# Patient Record
Sex: MALE | Race: OTHER | HISPANIC OR LATINO | URBAN - METROPOLITAN AREA
[De-identification: names, ages, dates, MRNs, and addresses within clinical notes are randomized per-mention and may not be internally consistent; named-entity substitution may affect disease eponyms.]

---

## 2017-08-30 ENCOUNTER — EMERGENCY (EMERGENCY)
Facility: HOSPITAL | Age: 32
LOS: 0 days | Discharge: HOME | End: 2017-08-30
Attending: EMERGENCY MEDICINE | Admitting: EMERGENCY MEDICINE
Payer: SELF-PAY

## 2017-08-30 VITALS — WEIGHT: 145.06 LBS | HEIGHT: 67 IN

## 2017-08-30 VITALS
HEART RATE: 66 BPM | DIASTOLIC BLOOD PRESSURE: 91 MMHG | RESPIRATION RATE: 16 BRPM | SYSTOLIC BLOOD PRESSURE: 148 MMHG | OXYGEN SATURATION: 100 % | TEMPERATURE: 98 F

## 2017-08-30 PROCEDURE — 99284 EMERGENCY DEPT VISIT MOD MDM: CPT

## 2017-08-30 NOTE — ED STATDOCS - EYES, MLM
Pupils equal, round, and reactive to light. No foreign body. No fluorescein uptake. No hyphema. No hypopyon. Normal EOM. No conjunctival injection. +pain of the left eye with movements with accomodation and with pupillary constriction on light exam.

## 2017-08-30 NOTE — ED STATDOCS - CARE PLAN
Principal Discharge DX:	Chemical burn of eye or adnexa, left, initial encounter  Secondary Diagnosis:	Iritis

## 2017-08-30 NOTE — ED ADULT TRIAGE NOTE - CHIEF COMPLAINT QUOTE
Pt. to the ED C/O Left eye pain and redness with decreased visual difficulties- Pt. states acid solution got in his eye while he was painting at work yesterday- denies medical hx

## 2017-08-30 NOTE — ED STATDOCS - OBJECTIVE STATEMENT
used ID # [id number]. 30 y/o male with no PMHx presents to the ED c/o left eye redness after acid exposure to eye and left arm yesterday at work. Does not know name of the acid. Notes some white discharge. +blurry vision. +left eye pain with movement. +HA. Pt is a . Does not wear glasses or contacts.

## 2017-08-30 NOTE — ED STATDOCS - PROGRESS NOTE DETAILS
32 yo male presents with chemical spill to the left eye. Pain and change in vsion per pt. Fluorescein strip reveals no abrasion or ulceration. -Fredy Han PA-C Eye exam= R: 20/25, L: 20/20, Both eyes: 20/15. -Fredy Han PA-C
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
## 2017-08-31 DIAGNOSIS — T54.2X1A TOXIC EFFECT OF CORROSIVE ACIDS AND ACID-LIKE SUBSTANCES, ACCIDENTAL (UNINTENTIONAL), INITIAL ENCOUNTER: ICD-10-CM

## 2017-08-31 DIAGNOSIS — H57.9 UNSPECIFIED DISORDER OF EYE AND ADNEXA: ICD-10-CM

## 2017-08-31 DIAGNOSIS — T26.92XA CORROSION OF LEFT EYE AND ADNEXA, PART UNSPECIFIED, INITIAL ENCOUNTER: ICD-10-CM

## 2019-08-03 ENCOUNTER — EMERGENCY (EMERGENCY)
Facility: HOSPITAL | Age: 34
LOS: 0 days | Discharge: HOME | End: 2019-08-03
Admitting: HOSPITALIST
Payer: MEDICAID

## 2019-08-03 VITALS
DIASTOLIC BLOOD PRESSURE: 61 MMHG | RESPIRATION RATE: 18 BRPM | WEIGHT: 125 LBS | SYSTOLIC BLOOD PRESSURE: 124 MMHG | TEMPERATURE: 98 F | OXYGEN SATURATION: 98 % | HEART RATE: 55 BPM

## 2019-08-03 VITALS
DIASTOLIC BLOOD PRESSURE: 70 MMHG | OXYGEN SATURATION: 99 % | HEART RATE: 57 BPM | SYSTOLIC BLOOD PRESSURE: 121 MMHG | RESPIRATION RATE: 18 BRPM

## 2019-08-03 DIAGNOSIS — R07.9 CHEST PAIN, UNSPECIFIED: ICD-10-CM

## 2019-08-03 DIAGNOSIS — F17.210 NICOTINE DEPENDENCE, CIGARETTES, UNCOMPLICATED: ICD-10-CM

## 2019-08-03 DIAGNOSIS — R11.10 VOMITING, UNSPECIFIED: ICD-10-CM

## 2019-08-03 DIAGNOSIS — M79.622 PAIN IN LEFT UPPER ARM: ICD-10-CM

## 2019-08-03 DIAGNOSIS — R07.89 OTHER CHEST PAIN: ICD-10-CM

## 2019-08-03 DIAGNOSIS — F12.10 CANNABIS ABUSE, UNCOMPLICATED: ICD-10-CM

## 2019-08-03 LAB
ADD ON TEST-SPECIMEN IN LAB: SIGNIFICANT CHANGE UP
ALBUMIN SERPL ELPH-MCNC: 3.5 G/DL — SIGNIFICANT CHANGE UP (ref 3.3–5)
ALP SERPL-CCNC: 90 U/L — SIGNIFICANT CHANGE UP (ref 40–120)
ALT FLD-CCNC: 40 U/L — SIGNIFICANT CHANGE UP (ref 12–78)
ANION GAP SERPL CALC-SCNC: 7 MMOL/L — SIGNIFICANT CHANGE UP (ref 5–17)
AST SERPL-CCNC: 35 U/L — SIGNIFICANT CHANGE UP (ref 15–37)
BASOPHILS # BLD AUTO: 0.05 K/UL — SIGNIFICANT CHANGE UP (ref 0–0.2)
BASOPHILS NFR BLD AUTO: 0.6 % — SIGNIFICANT CHANGE UP (ref 0–2)
BILIRUB SERPL-MCNC: 0.3 MG/DL — SIGNIFICANT CHANGE UP (ref 0.2–1.2)
BUN SERPL-MCNC: 12 MG/DL — SIGNIFICANT CHANGE UP (ref 7–23)
CALCIUM SERPL-MCNC: 9.3 MG/DL — SIGNIFICANT CHANGE UP (ref 8.5–10.1)
CHLORIDE SERPL-SCNC: 108 MMOL/L — SIGNIFICANT CHANGE UP (ref 96–108)
CO2 SERPL-SCNC: 25 MMOL/L — SIGNIFICANT CHANGE UP (ref 22–31)
CREAT SERPL-MCNC: 0.94 MG/DL — SIGNIFICANT CHANGE UP (ref 0.5–1.3)
EOSINOPHIL # BLD AUTO: 0.12 K/UL — SIGNIFICANT CHANGE UP (ref 0–0.5)
EOSINOPHIL NFR BLD AUTO: 1.3 % — SIGNIFICANT CHANGE UP (ref 0–6)
GLUCOSE SERPL-MCNC: 142 MG/DL — HIGH (ref 70–99)
HCT VFR BLD CALC: 43.7 % — SIGNIFICANT CHANGE UP (ref 39–50)
HGB BLD-MCNC: 14.4 G/DL — SIGNIFICANT CHANGE UP (ref 13–17)
IMM GRANULOCYTES NFR BLD AUTO: 0.6 % — SIGNIFICANT CHANGE UP (ref 0–1.5)
LYMPHOCYTES # BLD AUTO: 2.52 K/UL — SIGNIFICANT CHANGE UP (ref 1–3.3)
LYMPHOCYTES # BLD AUTO: 28 % — SIGNIFICANT CHANGE UP (ref 13–44)
MCHC RBC-ENTMCNC: 30.7 PG — SIGNIFICANT CHANGE UP (ref 27–34)
MCHC RBC-ENTMCNC: 33 GM/DL — SIGNIFICANT CHANGE UP (ref 32–36)
MCV RBC AUTO: 93.2 FL — SIGNIFICANT CHANGE UP (ref 80–100)
MONOCYTES # BLD AUTO: 0.93 K/UL — HIGH (ref 0–0.9)
MONOCYTES NFR BLD AUTO: 10.3 % — SIGNIFICANT CHANGE UP (ref 2–14)
NEUTROPHILS # BLD AUTO: 5.34 K/UL — SIGNIFICANT CHANGE UP (ref 1.8–7.4)
NEUTROPHILS NFR BLD AUTO: 59.2 % — SIGNIFICANT CHANGE UP (ref 43–77)
PLATELET # BLD AUTO: 337 K/UL — SIGNIFICANT CHANGE UP (ref 150–400)
POTASSIUM SERPL-MCNC: 3.6 MMOL/L — SIGNIFICANT CHANGE UP (ref 3.5–5.3)
POTASSIUM SERPL-SCNC: 3.6 MMOL/L — SIGNIFICANT CHANGE UP (ref 3.5–5.3)
PROT SERPL-MCNC: 7.6 GM/DL — SIGNIFICANT CHANGE UP (ref 6–8.3)
RBC # BLD: 4.69 M/UL — SIGNIFICANT CHANGE UP (ref 4.2–5.8)
RBC # FLD: 13.1 % — SIGNIFICANT CHANGE UP (ref 10.3–14.5)
SODIUM SERPL-SCNC: 140 MMOL/L — SIGNIFICANT CHANGE UP (ref 135–145)
WBC # BLD: 9.01 K/UL — SIGNIFICANT CHANGE UP (ref 3.8–10.5)
WBC # FLD AUTO: 9.01 K/UL — SIGNIFICANT CHANGE UP (ref 3.8–10.5)

## 2019-08-03 PROCEDURE — 93010 ELECTROCARDIOGRAM REPORT: CPT

## 2019-08-03 PROCEDURE — 99284 EMERGENCY DEPT VISIT MOD MDM: CPT

## 2019-08-03 PROCEDURE — 71046 X-RAY EXAM CHEST 2 VIEWS: CPT | Mod: 26

## 2019-08-03 RX ORDER — ONDANSETRON 8 MG/1
4 TABLET, FILM COATED ORAL ONCE
Refills: 0 | Status: COMPLETED | OUTPATIENT
Start: 2019-08-03 | End: 2019-08-03

## 2019-08-03 RX ORDER — ONDANSETRON 8 MG/1
1 TABLET, FILM COATED ORAL
Qty: 9 | Refills: 0
Start: 2019-08-03 | End: 2019-08-05

## 2019-08-03 RX ORDER — IBUPROFEN 200 MG
1 TABLET ORAL
Qty: 12 | Refills: 0
Start: 2019-08-03 | End: 2019-08-06

## 2019-08-03 RX ORDER — KETOROLAC TROMETHAMINE 30 MG/ML
30 SYRINGE (ML) INJECTION ONCE
Refills: 0 | Status: DISCONTINUED | OUTPATIENT
Start: 2019-08-03 | End: 2019-08-03

## 2019-08-03 RX ORDER — SODIUM CHLORIDE 9 MG/ML
1000 INJECTION INTRAMUSCULAR; INTRAVENOUS; SUBCUTANEOUS ONCE
Refills: 0 | Status: COMPLETED | OUTPATIENT
Start: 2019-08-03 | End: 2019-08-03

## 2019-08-03 RX ADMIN — SODIUM CHLORIDE 2000 MILLILITER(S): 9 INJECTION INTRAMUSCULAR; INTRAVENOUS; SUBCUTANEOUS at 08:13

## 2019-08-03 RX ADMIN — SODIUM CHLORIDE 1000 MILLILITER(S): 9 INJECTION INTRAMUSCULAR; INTRAVENOUS; SUBCUTANEOUS at 08:41

## 2019-08-03 RX ADMIN — Medication 30 MILLIGRAM(S): at 08:13

## 2019-08-03 RX ADMIN — Medication 30 MILLIGRAM(S): at 08:41

## 2019-08-03 RX ADMIN — ONDANSETRON 4 MILLIGRAM(S): 8 TABLET, FILM COATED ORAL at 08:13

## 2019-08-03 NOTE — ED PROVIDER NOTE - OBJECTIVE STATEMENT
33M with no PMHx p/w pain to left upper chest, arm, neck and axillary area. Patient also developed vomiting during this time, nbnb. pain is worse with movement and palpation to area. denies any trauma, rashes, fevers, chills, sob. has had similar sx in the past, normal workup. Does admit to daily marijuana use, tobacco use.     No PMD

## 2019-08-03 NOTE — ED PROVIDER NOTE - NSFOLLOWUPCLINICS_GEN_ALL_ED_FT
[facility]  Family Medicine  [address] [place] [address]  Phone: [phone]  Fax:   Follow Up Time: 1-3 Days

## 2019-08-03 NOTE — ED ADULT NURSE NOTE - NSIMPLEMENTINTERV_GEN_ALL_ED
Implemented All Universal Safety Interventions:  [place] to call system. Call bell, personal items and telephone within reach. Instruct patient to call for assistance. Room bathroom lighting operational. Non-slip footwear when patient is off stretcher. Physically safe environment: no spills, clutter or unnecessary equipment. Stretcher in lowest position, wheels locked, appropriate side rails in place.

## 2019-08-03 NOTE — ED PROVIDER NOTE - NSFOLLOWUPINSTRUCTIONS_ED_ALL_ED_FT
Please take zofran as needed for nausea and vomiting  Please take ibuprofen as needed for pain, take with food.  Please return to ED for any worsening symptoms.   Please follow up at [facility] for primary care follow up    Musculoskeletal Pain    WHAT YOU NEED TO KNOW:    Muscle pain can be dull, achy, or sharp. You may have pain and tenderness to the touch as well. It can occur anywhere on your body and is often brought on by exercise. Muscle pain may occur from an injury, such as a sprain, tendonitis, or bone fracture. Muscle pain can also be the result of medical conditions, such as polymyositis, fibromyalgia, and connective tissue disorders.     DISCHARGE INSTRUCTIONS:    Self care:     Rest as directed and avoid activity that causes pain. You may be able to return to normal activity when you can move without pain. Follow directions for rest and activity. You are at risk for injury for 3 weeks after your symptoms go away.       Ice your painful muscle area to decrease pain and swelling. Use an ice pack, or put ice in a plastic bag and cover it with a towel. Always put a cloth between the ice and your skin. Apply the ice as often as directed for the first 24 to 48 hours.       Compression with a splint, brace, or elastic bandage helps decrease pain and swelling. This may be needed for muscle pain in arms or legs. A splint, brace, or bandage will also help protect the painful area when you move around.       Elevate a painful arm or leg to reduce swelling and pain. Elevate your limb while you are sitting or lying down. Prop a painful leg on pillows to keep it above the level of your heart.    Medicines:     NSAIDs help decrease swelling and pain or fever. This medicine is available with or without a doctor's order. NSAIDs can cause stomach bleeding or kidney problems in certain people. If you take blood thinner medicine, always ask your healthcare provider if NSAIDs are safe for you. Always read the medicine label and follow directions.      Acetaminophen is used to decrease pain. It is available without a doctor's order. Ask your healthcare provider how much to take and when to take it. Follow directions. Acetaminophen can cause liver damage if not taken correctly. Do not take more than one medicine that contains acetaminophen unless directed.       Muscle relaxers help relax your muscles to decrease pain and muscle spasms.       Steroids may be given to decrease redness, pain, and swelling.      Take your medicine as directed. Contact your healthcare provider if you think your medicine is not helping or if you have side effects. Tell him if you are allergic to any medicine. Keep a list of the medicines, vitamins, and herbs you take. Include the amounts, and when and why you take them. Bring the list or the pill bottles to follow-up visits. Carry your medicine list with you in case of an emergency.    Follow up with your healthcare provider as directed: You may need more tests to help healthcare providers find the cause of your muscle pain. You may need physical therapy to learn muscle strengthening exercises. Write down your questions so you remember to ask them during your visits.     Contact your healthcare provider if:     You have a fever.       You have trouble sleeping because of your pain.       Your painful area becomes more tender, red, and warm to the touch.       You have decreased movement of the painful area.       You have questions or concerns about your condition or care.    Return to the emergency department if:     You have increased severe pain when you move the painful muscle area.       You lose feeling in your painful muscle area.       You have new or worse swelling in the painful area. Your skin may feel tight.       You have increased muscle pain or pain that does not improve with treatment.

## 2019-08-03 NOTE — ED PROVIDER NOTE - CLINICAL SUMMARY MEDICAL DECISION MAKING FREE TEXT BOX
33M with vomiting and musculoskeletal left sided upper torso pain. labs, ekg, pain control 33M with vomiting and musculoskeletal left sided upper torso pain. labs, anti-emetic, pain control

## 2019-08-04 RX ORDER — ONDANSETRON 8 MG/1
1 TABLET, FILM COATED ORAL
Qty: 9 | Refills: 0
Start: 2019-08-04 | End: 2019-08-06

## 2019-08-04 RX ORDER — IBUPROFEN 200 MG
1 TABLET ORAL
Qty: 12 | Refills: 0
Start: 2019-08-04 | End: 2019-08-07

## 2020-12-26 NOTE — ED ADULT NURSE NOTE - NS ED NURSE IV DC DT
Pharmacy Automatic Renal Dosing Protocol - Antimicrobials Indication for Antimicrobials: HAP, COVID-19 Current Regimen of Each Antimicrobial: 
Vancomycin 1500 mg q24hr (Start Date ; Day # 2) Cefepime 2g IV q12h (Start Date ; Day 5) Previous Antimicrobial Therapy: 
Ceftriaxone 1g IV q24h (Start Date ; End date: ; Received 5 days of tx) Azithromycin 500 mg IV q24h (Start date ; Day # 6) Goal Level: VANCOMYCIN TROUGH GOAL RANGE Vancomycin Trough: 15 - 20 mcg/mL  (AUC: 400 - 600 mg/hr/Liter/day) Date Dose & Interval Measured (mcg/mL) Extrapolated (mcg/mL)  
 1500 mg q24h Significant Cultures:  
 Blood- NG , final 
 NGTD pending Radiology / Imaging results: (X-ray, CT scan or MRI):  
 CXR: IMPRESSION: Persistent bilateral airspace opacification with interval worsening 
in the left lower lobe.  CXR: Extensive interstitial and parenchymal opacities on the right and on the left Paralysis, amputations, malnutrition:  none noted Labs: 
Recent Labs  
  20 
0340 20 
0217 20 
0341 CREA 1.43* 1.52* 1.18* BUN 61* 50* 41* WBC 21.7* 27.7* 25.2* Temp (24hrs), Av.4 °F (35.8 °C), Min:94 °F (34.4 °C), Max:98.9 °F (37.2 °C) Is the Patient on Dialysis? No 
 
Creatinine Clearance (mL/min):  
CrCl (Adjusted Body Weight): 56.2 CrCl (Ideal Body Weight): 40.6 Impression/Plan:  
Scr slightly improved Continue current dose of abx; appropriate for indication/renal function Vancomycin  trough prior to dose due  at 2000 Antimicrobial stop date  for cefepime, 14 days for vancomycin Pharmacy will follow daily and adjust medications as appropriate for renal function and/or serum levels. Thank you, EDUARDO Guzman 
 
 
 
 
 03-Aug-2019 09:22

## 2022-07-29 ENCOUNTER — EMERGENCY (EMERGENCY)
Facility: HOSPITAL | Age: 37
LOS: 0 days | Discharge: HOME | End: 2022-07-29
Attending: EMERGENCY MEDICINE
Payer: MEDICAID

## 2022-07-29 VITALS
SYSTOLIC BLOOD PRESSURE: 161 MMHG | OXYGEN SATURATION: 100 % | DIASTOLIC BLOOD PRESSURE: 68 MMHG | RESPIRATION RATE: 19 BRPM | TEMPERATURE: 99 F | HEART RATE: 63 BPM

## 2022-07-29 VITALS — HEIGHT: 78 IN

## 2022-07-29 DIAGNOSIS — Z23 ENCOUNTER FOR IMMUNIZATION: ICD-10-CM
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
## 2022-07-29 DIAGNOSIS — S61.214A LACERATION WITHOUT FOREIGN BODY OF RIGHT RING FINGER WITHOUT DAMAGE TO NAIL, INITIAL ENCOUNTER: ICD-10-CM

## 2022-07-29 PROCEDURE — 90715 TDAP VACCINE 7 YRS/> IM: CPT

## 2022-07-29 PROCEDURE — 12001 RPR S/N/AX/GEN/TRNK 2.5CM/<: CPT

## 2022-07-29 PROCEDURE — 99283 EMERGENCY DEPT VISIT LOW MDM: CPT | Mod: 25

## 2022-07-29 RX ORDER — TETANUS TOXOID, REDUCED DIPHTHERIA TOXOID AND ACELLULAR PERTUSSIS VACCINE, ADSORBED 5; 2.5; 8; 8; 2.5 [IU]/.5ML; [IU]/.5ML; UG/.5ML; UG/.5ML; UG/.5ML
0.5 SUSPENSION INTRAMUSCULAR ONCE
Refills: 0 | Status: COMPLETED | OUTPATIENT
Start: 2022-07-29 | End: 2022-07-29

## 2022-07-29 RX ORDER — CEPHALEXIN 500 MG
1 CAPSULE ORAL
Qty: 21 | Refills: 0
Start: 2022-07-29 | End: 2022-08-04

## 2022-07-29 RX ORDER — CEPHALEXIN 500 MG
500 CAPSULE ORAL ONCE
Refills: 0 | Status: COMPLETED | OUTPATIENT
Start: 2022-07-29 | End: 2022-07-29

## 2022-07-29 RX ORDER — OXYCODONE AND ACETAMINOPHEN 5; 325 MG/1; MG/1
1 TABLET ORAL ONCE
Refills: 0 | Status: DISCONTINUED | OUTPATIENT
Start: 2022-07-29 | End: 2022-07-29

## 2022-07-29 RX ADMIN — OXYCODONE AND ACETAMINOPHEN 1 TABLET(S): 5; 325 TABLET ORAL at 18:29

## 2022-07-29 RX ADMIN — Medication 500 MILLIGRAM(S): at 18:29

## 2022-07-29 RX ADMIN — TETANUS TOXOID, REDUCED DIPHTHERIA TOXOID AND ACELLULAR PERTUSSIS VACCINE, ADSORBED 0.5 MILLILITER(S): 5; 2.5; 8; 8; 2.5 SUSPENSION INTRAMUSCULAR at 18:08

## 2022-07-29 NOTE — ED STATDOCS - PROGRESS NOTE DETAILS
pt here with finger avulsion from using a table saw to his right th finger. pt laceration repaired with 2 sutures to avulsed area, wound edges approximated at best as possible but skin still missing, pt aware to take antibiotics as directed and fu in 10 days for suture removal. pt well appearing on dc and agrees with plan. -Iris Goldstein PA-C

## 2022-07-29 NOTE — ED STATDOCS - OBJECTIVE STATEMENT
Hx obtained from [facility] . 37 y/o male with no significant PMHx presents to the ED c/o laceration to right 4th digit while using table saw 4 hours PTA. No other injuries. Hx obtained from [facility] . 35 y/o male with no significant PMHx presents to the ED c/o laceration to right 4th digit while using table saw 4 hours PTA. No other injuries. RHD. mild pain constant non-radiating.

## 2022-07-29 NOTE — ED STATDOCS - CLINICAL SUMMARY MEDICAL DECISION MAKING FREE TEXT BOX
37 y/o male with no significant PMHx presents to the ED for finger laceration. Exam with right 4th digit with skin avulsion to pulp. Will lac repair and discharge. 37 y/o male with no significant PMHx presents to the ED for finger laceration. Exam with right 4th digit with skin avulsion to pulp. normal flexion extension Will lac repair and discharge.

## 2022-07-29 NOTE — ED ADULT TRIAGE NOTE - CHIEF COMPLAINT QUOTE
Pt presents to ER c/o finger lacerations. Pt was using table saw when he cut the tips of his right 2nd and 3rd fingers. Bleeding controlled

## 2022-07-29 NOTE — ED STATDOCS - NS ED ROS FT
Constitutional: No fever or chills  Eyes: No visual changes  HEENT: No throat pain  CV: No chest pain  Resp: No SOB no cough  GI: No abd pain, nausea or vomiting  : No dysuria  MSK: No musculoskeletal pain  Skin: +lacerations  Neuro: No headache

## 2022-07-29 NOTE — ED STATDOCS - NSFOLLOWUPCLINICS_GEN_ALL_ED_FT
[facility]  Family Medicine  [address] [place] [address]  Phone: [phone]  Fax:   Follow Up Time: Urgent

## 2022-07-29 NOTE — ED STATDOCS - NS ED ATTENDING STATEMENT MOD
This was a shared visit with the ADEEL. I reviewed and verified the documentation and independently performed the documented:

## 2022-07-29 NOTE — ED STATDOCS - PHYSICAL EXAMINATION
Constitutional: NAD AAOx3  Eyes: PERRLA EOMI  Head: Normocephalic atraumatic  Mouth: MMM  Cardiac: regular rate   Resp: Lungs CTAB  GI: Abd s/nt/nd  Neuro: CN2-12 intact  Skin: right 4th digit with skin avulsion to pulp. Constitutional: NAD AAOx3  Eyes: PERRLA EOMI  Head: Normocephalic atraumatic  Mouth: MMM  Cardiac: regular rate   Resp: normal respiratory rate  Neuro: moving all extremities  Skin: right 4th digit with skin avulsion to pulp. normal flexion extension capreill  Msk: no bony ttp

## 2022-07-29 NOTE — ED STATDOCS - NS_ ATTENDINGSCRIBEDETAILS _ED_A_ED_FT
I, Andres Truong MD,  performed the initial face to face bedside interview with this patient regarding history of present illness, review of symptoms and relevant past medical, social and family history.  I completed an independent physical examination.  I was the initial provider who evaluated this patient.   I personally saw the patient and performed a substantive portion of the visit including all aspects of the medical decision making.  The history, relevant review of systems, past medical and surgical history, medical decision making, and physical examination was documented by the scribe in my presence and I attest to the accuracy of the documentation.

## 2022-07-29 NOTE — ED STATDOCS - ATTENDING APP SHARED VISIT CONTRIBUTION OF CARE
I, Andres Truong MD, personally saw the patient with ACP.  I have personally performed a face to face diagnostic evaluation on this patient.  I have reviewed the ACP note and agree with the history, exam, and plan of care, except as noted.   The initial assessment was performed by myself and then the patient was handed off to the ACP. The patient was followed and re-evaluated by the ACP. All labs, imaging and procedures were evaluated and performed by the ACP and I was available for consultation if any questions in the patients care came up.

## 2022-07-29 NOTE — ED STATDOCS - PATIENT PORTAL LINK FT
You can access the FollowMyHealth Patient Portal offered by [facility] by registering at the following website: [email or web address]. By joining [employer]’s FollowMyHealth portal, you will also be able to view your health information using other applications (apps) compatible with our system.

## 2023-07-30 ENCOUNTER — EMERGENCY (EMERGENCY)
Facility: HOSPITAL | Age: 38
LOS: 1 days | Discharge: LEFT BEFORE DISCHARGE | End: 2023-07-30
Attending: EMERGENCY MEDICINE
Payer: MEDICAID

## 2023-07-30 VITALS — HEIGHT: 66 IN | WEIGHT: 149.91 LBS

## 2023-07-30 VITALS
TEMPERATURE: 99 F | SYSTOLIC BLOOD PRESSURE: 154 MMHG | DIASTOLIC BLOOD PRESSURE: 80 MMHG | HEART RATE: 93 BPM | RESPIRATION RATE: 18 BRPM | OXYGEN SATURATION: 98 %

## 2023-07-30 DIAGNOSIS — Z53.21 PROCEDURE AND TREATMENT NOT CARRIED OUT DUE TO PATIENT LEAVING PRIOR TO BEING SEEN BY HEALTH CARE PROVIDER: ICD-10-CM

## 2023-07-30 DIAGNOSIS — R07.0 PAIN IN THROAT: ICD-10-CM

## 2023-07-30 DIAGNOSIS — R50.9 FEVER, UNSPECIFIED: ICD-10-CM

## 2023-07-30 DIAGNOSIS — R51.9 HEADACHE, UNSPECIFIED: ICD-10-CM

## 2023-07-30 PROCEDURE — L9992: CPT

## 2023-07-30 NOTE — ED ADULT TRIAGE NOTE - CHIEF COMPLAINT QUOTE
Pt. presenting to ED c/o throat pain, HA, and fevers since this morning. Pt. endorses taking tylenol this evening

## 2024-02-09 ENCOUNTER — EMERGENCY (EMERGENCY)
Facility: HOSPITAL | Age: 39
LOS: 0 days | Discharge: HOME | End: 2024-02-09
Attending: STUDENT IN AN ORGANIZED HEALTH CARE EDUCATION/TRAINING PROGRAM
Payer: MEDICAID

## 2024-02-09 VITALS — HEIGHT: 65 IN | WEIGHT: 139.99 LBS

## 2024-02-09 VITALS
SYSTOLIC BLOOD PRESSURE: 132 MMHG | OXYGEN SATURATION: 100 % | TEMPERATURE: 98 F | HEART RATE: 82 BPM | DIASTOLIC BLOOD PRESSURE: 82 MMHG | RESPIRATION RATE: 18 BRPM

## 2024-02-09 DIAGNOSIS — S00.01XA ABRASION OF SCALP, INITIAL ENCOUNTER: ICD-10-CM
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
## 2024-02-09 PROCEDURE — 99283 EMERGENCY DEPT VISIT LOW MDM: CPT

## 2024-02-09 RX ORDER — ACETAMINOPHEN 500 MG
650 TABLET ORAL ONCE
Refills: 0 | Status: COMPLETED | OUTPATIENT
Start: 2024-02-09 | End: 2024-02-09

## 2024-02-09 RX ADMIN — Medication 650 MILLIGRAM(S): at 18:27

## 2024-02-09 NOTE — ED ADULT NURSE NOTE - OBJECTIVE STATEMENT
Pt presents to the ED after he opened car dose and struck his head, no LOC, no N/V, tetanus uptodate; no blurred vision or double vision; no other trauma or injury; no change in gait.

## 2024-02-09 NOTE — ED STATDOCS - PATIENT PORTAL LINK FT
You can access the FollowMyHealth Patient Portal offered by [facility] by registering at the following website: [email or web address]. By joining [employer]’s FollowMyHealth portal, you will also be able to view your health information using other applications (apps) compatible with our system.

## 2024-02-09 NOTE — ED STATDOCS - CLINICAL SUMMARY MEDICAL DECISION MAKING FREE TEXT BOX
pt with closed head injury, shared decision making CT deferred this time, tetanus UTD, wound care and re-evaluate

## 2024-02-09 NOTE — ED STATDOCS - OBJECTIVE STATEMENT
37 y/o male presents to the ED after he opened car dose and struck his head, no LOC, no N/V, tetanus.      : language line: [id number] 39 y/o male presents to the ED after he opened car dose and struck his head, no LOC, no N/V, tetanus uptodate; no blurred vision or double vision; no other trauma or injury; no change in gait.       : language line: [id number]

## 2024-02-09 NOTE — ED STATDOCS - ATTENDING APP SHARED VISIT CONTRIBUTION OF CARE
I, Allie Sanchez DO,  performed the initial face to face bedside interview with this patient regarding history of present illness, review of symptoms and relevant past medical, social and family history.  I completed an independent physical examination.  I was the initial provider who evaluated this patient.   I personally saw the patient and performed a substantive portion of the visit including all aspects of the medical decision making.  I have signed out the follow up of any pending tests (i.e. labs, radiological studies) to the ACP.  I have communicated the patient’s plan of care and disposition with the ACP.  The history, relevant review of systems, past medical and surgical history, medical decision making, and physical examination was documented by the scribe in my presence and I attest to the accuracy of the documentation.

## 2024-02-09 NOTE — ED STATDOCS - PROGRESS NOTE DETAILS
Using SI. 37 yo male presents with mid parietal head injury as he open the car door and hit himself in the head. +bleeding. Last tdap is UTD per pt. +superficial abrasion to the scalp with no active bleeding.   Neuro exam unremarkable. Will observe and not perform CT at this time and reeval. -Fredy Han PA-C Using SI, pt still feeling well after his observation period. WIll dc home and have pt f/u with pmd. -Fredy Han PA-C

## 2024-02-09 NOTE — ED STATDOCS - NSCAREINITIATED _GEN_ER
Allie Sanchez(Attending)

## 2024-02-09 NOTE — ED STATDOCS - NSFOLLOWUPINSTRUCTIONS_ED_ALL_ED_FT
Dmitriy un seguimiento con cardenas médico primario en los próximos días. Dmitriy un seguimiento con cardenas médico primario en los próximos días.        Lesión en la gabriella en los adultos  Head Injury, Adult    Hay muchos tipos de lesiones en la gabriella. Las lesiones en la gabriella pueden ser tan leves kevin un chichón pequeño o pueden ser un problema médico grave. Algunas de las lesiones graves en la gabriella son:  Lesión que provoque un impacto en el cerebro (conmoción cerebral).  Un moretón (contusión) en el cerebro. [place] significa que hay hemorragia en el cerebro que puede causar hinchazón.  Fisura en el cráneo (fractura de cráneo).  Hemorragia en el cerebro que se acumula, se coagula y forma javier protuberancia (hematoma).  Después de javier lesión en la gabriella, la mayoría de los problemas ocurren dentro de las primeras 24 horas, anmol los efectos secundarios pueden aparecer entre 7 y 10 días después de la lesión. Es importante controlar cardenas afección para asad si hay cambios. Es posible que deban observarlo en el departamento de emergencias o en el servicio de atención urgente, o también puede ser que deban hospitalizarlo.    ¿Cuáles son las causas?  Hay muchas causas posibles de javier lesión en la gabriella. Las lesiones graves en la gabriella puede deberse a un accidente automovilístico, a accidentes en bicicleta o motocicleta, a lesiones deportivas, a caídas y a ser golpeado por un objeto.    ¿Cuáles son los síntomas?  Los síntomas de lesión en la gabriella incluyen javier contusión, un chichón o sangrado en el lugar de la lesión. Otros síntomas físicos pueden ser:  Dolor de gabriella.  Náuseas o vómitos.  Mareos.  Visión borrosa o doble.  Sentir incomodidad cerca de luces brillantes o ruidos dillan.  Convulsiones.  Cansancio.  Dificultad para despertarse.  Pérdida de la conciencia.  Los síntomas mentales o emocionales pueden incluir los siguientes:  Irritabilidad.  Confusión y problemas de memoria.  Falta de atención y concentración.  Cambios en los hábitos de alimentación o en el sueño.  Sentir ansiedad o depresión.  ¿Cómo se diagnostica?  Esta afección suele diagnosticarse en función de los síntomas, de javier descripción de la lesión y de un examen físico. También pueden hacerle estudios de diagnóstico por imágenes, kevin javier resonancia magnética (RM) o javier exploración por tomografía computarizada (TC).    ¿Cómo se trata?  El tratamiento de esta afección depende de la gravedad y el tipo de lesión que sufrió. El objetivo principal del tratamiento es prevenir complicaciones y darle tiempo al cerebro para que se recupere.    Lesión de gabriella leve    Si usted sufre javier lesión de gabriella leve, es posible que lo envíen a casa, y el tratamiento puede incluir lo siguiente:  Observación. Un adulto responsable debe quedarse con usted polly 24 horas después de producida la lesión y controlarlo con frecuencia.  Richton físico.  Richton cerebral.  Analgésicos.  Lesión de gabriella grave    Si tiene javier lesión de gabriella grave, el tratamiento puede incluir lo siguiente:  Observación minuciosa. [place] incluye hospitalización con la siguiente atención:  Exámenes físicos frecuentes.  Controles frecuentes del funcionamiento del cerebro y el sistema nervioso (estado neurológico).  Control de la presión arterial y los niveles de oxígeno.  Medicamentos para aliviar el dolor, prevenir las convulsiones y disminuir la hinchazón del cerebro.  Protección de las vías respiratorias y asistencia respiratoria. [place] puede incluir un respirador.  Tratamientos para controlar y tratar la hinchazón dentro del cerebro.  Cirugía de cerebro. Esta puede ser necesaria en los siguientes casos:  Extraer javier acumulación de vaen o coágulos de vane.  Interrumpir el sangrado.  Retirar javier parte del cráneo para dejar espacio a fin de que el cerebro se hinche.  Siga estas instrucciones en cardenas casa:  Actividad    Descanse y evite actividades que mauricio físicamente difíciles o agotadoras.  Asegúrese de dormir lo suficiente.  Deje que el cerebro descanse limitando las actividades que requieran pensar mucho o prestar atención, kevin las siguientes:  Mirar televisión.  Jugar juegos de memoria y armar rompecabezas.  Tareas para el hogar o trabajos relacionados con el empleo.  Trabajar en la computadora, usar las redes sociales y enviar mensajes de texto.  Evite actividades que puedan causar otra lesión en la gabriella, kevin practicar deportes, hasta que el médico lo autorice. Puede ser peligroso tener otra lesión en la gabriella antes de que se haya recuperado de la primera.  Pregúntele al médico cuándo puede retomar valeria actividades habituales, incluidos el trabajo o el estudio. Pídale al médico un plan escalonado para retomar valeria actividades de forma gradual.  Consulte a cardenas médico sobre cuándo puede conducir, andar en bicicleta o usar maquinaria pesada. La capacidad para reaccionar puede ser más lenta luego de javier lesión cerebral. No realice estas actividades si se siente mareado.  Estilo de roger      No jacinta alcohol hasta que el médico lo autorice. No consuma drogas. El alcohol y ciertas drogas pueden demorar cardenas recuperación y ponerlo en riesgo de nuevas lesiones.  Si le resulta más difícil que lo habitual recordar las cosas, escríbalas.  Trate de hacer javier cosa por vez si se distrae con facilidad.  Consulte con familiares y amigos si debe radu decisiones importantes.  Cuénteles sobre cardenas lesión, los síntomas y las restricciones a valeria amigos, a cardenas otoniel, a un colega de confianza y a cardenas gerente en el trabajo. Pídales que observen si aparecen nuevos problemas o empeoran los existentes.  Instrucciones generales    [place] los medicamentos de venta chin y los recetados solamente kevin se lo haya indicado el médico.  Pídale a alguien que lo acompañe polly 24 horas después de la lesión en la gabriella. Esta persona debe observar cambios en los síntomas y estar preparada para obtener ayuda médica.  Concurra a todas las visitas de seguimiento kevin se lo haya indicado el médico. [place] es importante.  ¿Cómo se moisés?  Trabaje para mejorar el equilibrio y la fuerza a fin de evitar caídas.  Use el cinturón de seguridad cuando se encuentre en un vehículo en movimiento.  Use un janina al andar en bicicleta, esquiar o practicar cualquier otro deporte o actividad que tenga riesgo de lesiones.  Si marilynn alcohol:  Limite la cantidad que marilynn:  De 0 a 1 medida por día para las mujeres que no estén embarazadas.  De 0 a 2 medidas por día para los hombres.  Esté atento a la cantidad de alcohol que hay en las bebidas que arabella. En los [place], javier medida equivale a javier botella de cerveza de 12 oz (355 ml), un vaso de vino de 5 oz (148 ml) o un vaso de javier bebida alcohólica de chauncey graduación de 1½ oz (44 ml).  [place] medidas de seguridad en cardenas hogar, por ejemplo:  Mantenga los pisos y las escaleras en orden.  Coloque barras para sostén en los vianey y pasamanos en las escaleras.  Ponga alfombras antideslizantes en pisos y bañeras.  Mejore la iluminación en las zonas de penumbra.  Dónde buscar más información  Centers for Disease Control and Prevention (Centros para el Control y la Prevención de Enfermedades): www.cdc.gov  Solicite ayuda de inmediato si:  Tiene lo siguiente:  Dolor de gabriella intenso que no desaparece con los medicamentos.  Dificultad para caminar o debilidad en los brazos o las piernas.  Secreción transparente o con vane que proviene de la nariz o de los oídos.  Cambios en la visión.  Javier convulsión.  Mayor confusión o irritabilidad.  Valeria síntomas empeoran.  Está más somnoliento de lo normal o tiene dificultad para mantenerse despierto.  Pierde el equilibrio.  Las pupilas cambian de tamaño.  Arrastra las palabras.  Cardenas mareo empeora.  Vomita.  Estos síntomas pueden representar un problema grave que constituye javier emergencia. No espere a asad si los síntomas desaparecen. Solicite atención médica de inmediato. Comuníquese con el servicio de emergencias de cardenas localidad (911 en los [place]). No conduzca por valeria propios medios hasta el hospital.    Resumen  Las lesiones en la gabriella pueden ser leves o pueden ser un problema médico grave que requiere atención inmediata.  El tratamiento de esta afección depende de la gravedad y el tipo de lesión que sufrió.  Pídale a alguien que lo acompañe polly 24 horas después de la lesión y que marzena cómo está usted con frecuencia.  Pregúntele al médico cuándo puede retomar valeria actividades habituales, incluidos el trabajo o el estudio.  La prevención de lesiones en la gabriella incluye el uso de cinturón de seguridad en un vehículo motorizado, el uso de janina en bicicleta, limitar el consumo de alcohol y radu medidas de seguridad en el hogar.

## 2024-02-09 NOTE — ED ADULT NURSE NOTE - CAS EDN DISCHARGE ASSESSMENT
Pt will be able to move in & out of bed by self independently in 2weeks Alert and oriented to person, place and time/Patient baseline mental status/Awake
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
## 2025-09-08 ENCOUNTER — EMERGENCY (EMERGENCY)
Facility: HOSPITAL | Age: 40
LOS: 0 days | Discharge: HOME | End: 2025-09-08
Attending: EMERGENCY MEDICINE
Payer: MEDICAID

## 2025-09-08 VITALS
OXYGEN SATURATION: 96 % | WEIGHT: 176.81 LBS | RESPIRATION RATE: 18 BRPM | TEMPERATURE: 98 F | SYSTOLIC BLOOD PRESSURE: 139 MMHG | DIASTOLIC BLOOD PRESSURE: 67 MMHG | HEART RATE: 54 BPM
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
## 2025-09-08 DIAGNOSIS — R51.9 HEADACHE, UNSPECIFIED: ICD-10-CM

## 2025-09-08 DIAGNOSIS — H05.20 UNSPECIFIED EXOPHTHALMOS: ICD-10-CM

## 2025-09-08 LAB
ADD ON TEST-SPECIMEN IN LAB: SIGNIFICANT CHANGE UP
ADD ON TEST-SPECIMEN IN LAB: SIGNIFICANT CHANGE UP
ALBUMIN SERPL ELPH-MCNC: 3.4 G/DL — SIGNIFICANT CHANGE UP (ref 3.3–5)
ALP SERPL-CCNC: 80 U/L — SIGNIFICANT CHANGE UP (ref 40–120)
ALT FLD-CCNC: 31 U/L — SIGNIFICANT CHANGE UP (ref 12–78)
ANION GAP SERPL CALC-SCNC: 4 MMOL/L — LOW (ref 5–17)
APTT BLD: 28.6 SEC — SIGNIFICANT CHANGE UP (ref 26.1–36.8)
AST SERPL-CCNC: 17 U/L — SIGNIFICANT CHANGE UP (ref 15–37)
BASOPHILS # BLD AUTO: 0.06 K/UL — SIGNIFICANT CHANGE UP (ref 0–0.2)
BASOPHILS NFR BLD AUTO: 0.5 % — SIGNIFICANT CHANGE UP (ref 0–2)
BILIRUB SERPL-MCNC: 0.2 MG/DL — SIGNIFICANT CHANGE UP (ref 0.2–1.2)
BUN SERPL-MCNC: 13 MG/DL — SIGNIFICANT CHANGE UP (ref 7–23)
CALCIUM SERPL-MCNC: 9.2 MG/DL — SIGNIFICANT CHANGE UP (ref 8.5–10.1)
CHLORIDE SERPL-SCNC: 104 MMOL/L — SIGNIFICANT CHANGE UP (ref 96–108)
CO2 SERPL-SCNC: 29 MMOL/L — SIGNIFICANT CHANGE UP (ref 22–31)
CREAT SERPL-MCNC: 0.74 MG/DL — SIGNIFICANT CHANGE UP (ref 0.5–1.3)
EGFR: 117 ML/MIN/1.73M2 — SIGNIFICANT CHANGE UP
EGFR: 117 ML/MIN/1.73M2 — SIGNIFICANT CHANGE UP
EOSINOPHIL # BLD AUTO: 0.27 K/UL — SIGNIFICANT CHANGE UP (ref 0–0.5)
EOSINOPHIL NFR BLD AUTO: 2.3 % — SIGNIFICANT CHANGE UP (ref 0–6)
GLUCOSE SERPL-MCNC: 119 MG/DL — HIGH (ref 70–99)
HCT VFR BLD CALC: 45.9 % — SIGNIFICANT CHANGE UP (ref 39–50)
HGB BLD-MCNC: 14.9 G/DL — SIGNIFICANT CHANGE UP (ref 13–17)
IMM GRANULOCYTES # BLD AUTO: 0.03 K/UL — SIGNIFICANT CHANGE UP (ref 0–0.07)
IMM GRANULOCYTES NFR BLD AUTO: 0.3 % — SIGNIFICANT CHANGE UP (ref 0–0.9)
INR BLD: 0.9 RATIO — SIGNIFICANT CHANGE UP (ref 0.85–1.16)
LYMPHOCYTES # BLD AUTO: 2.89 K/UL — SIGNIFICANT CHANGE UP (ref 1–3.3)
LYMPHOCYTES NFR BLD AUTO: 24.1 % — SIGNIFICANT CHANGE UP (ref 13–44)
MCHC RBC-ENTMCNC: 30.1 PG — SIGNIFICANT CHANGE UP (ref 27–34)
MCHC RBC-ENTMCNC: 32.5 G/DL — SIGNIFICANT CHANGE UP (ref 32–36)
MCV RBC AUTO: 92.7 FL — SIGNIFICANT CHANGE UP (ref 80–100)
MONOCYTES # BLD AUTO: 0.71 K/UL — SIGNIFICANT CHANGE UP (ref 0–0.9)
MONOCYTES NFR BLD AUTO: 5.9 % — SIGNIFICANT CHANGE UP (ref 2–14)
NEUTROPHILS # BLD AUTO: 8.01 K/UL — HIGH (ref 1.8–7.4)
NEUTROPHILS NFR BLD AUTO: 66.9 % — SIGNIFICANT CHANGE UP (ref 43–77)
NRBC # BLD AUTO: 0 K/UL — SIGNIFICANT CHANGE UP (ref 0–0)
NRBC # FLD: 0 K/UL — SIGNIFICANT CHANGE UP (ref 0–0)
NRBC BLD AUTO-RTO: 0 /100 WBCS — SIGNIFICANT CHANGE UP (ref 0–0)
PLATELET # BLD AUTO: 394 K/UL — SIGNIFICANT CHANGE UP (ref 150–400)
PMV BLD: 8.9 FL — SIGNIFICANT CHANGE UP (ref 7–13)
POTASSIUM SERPL-MCNC: 3.7 MMOL/L — SIGNIFICANT CHANGE UP (ref 3.5–5.3)
POTASSIUM SERPL-SCNC: 3.7 MMOL/L — SIGNIFICANT CHANGE UP (ref 3.5–5.3)
PROT SERPL-MCNC: 7.1 GM/DL — SIGNIFICANT CHANGE UP (ref 6–8.3)
PROTHROM AB SERPL-ACNC: 10.5 SEC — SIGNIFICANT CHANGE UP (ref 9.9–13.4)
RBC # BLD: 4.95 M/UL — SIGNIFICANT CHANGE UP (ref 4.2–5.8)
RBC # FLD: 13.6 % — SIGNIFICANT CHANGE UP (ref 10.3–14.5)
SODIUM SERPL-SCNC: 137 MMOL/L — SIGNIFICANT CHANGE UP (ref 135–145)
TSH SERPL-MCNC: 1.25 UU/ML — SIGNIFICANT CHANGE UP (ref 0.34–4.82)
WBC # BLD: 11.97 K/UL — HIGH (ref 3.8–10.5)
WBC # FLD AUTO: 11.97 K/UL — HIGH (ref 3.8–10.5)

## 2025-09-08 PROCEDURE — 99284 EMERGENCY DEPT VISIT MOD MDM: CPT

## 2025-09-08 PROCEDURE — 76376 3D RENDER W/INTRP POSTPROCES: CPT | Mod: 26

## 2025-09-08 PROCEDURE — 84443 ASSAY THYROID STIM HORMONE: CPT

## 2025-09-08 PROCEDURE — 99284 EMERGENCY DEPT VISIT MOD MDM: CPT | Mod: 25

## 2025-09-08 PROCEDURE — 85025 COMPLETE CBC W/AUTO DIFF WBC: CPT

## 2025-09-08 PROCEDURE — 76376 3D RENDER W/INTRP POSTPROCES: CPT

## 2025-09-08 PROCEDURE — 70480 CT ORBIT/EAR/FOSSA W/O DYE: CPT

## 2025-09-08 PROCEDURE — 36415 COLL VENOUS BLD VENIPUNCTURE: CPT

## 2025-09-08 PROCEDURE — 85610 PROTHROMBIN TIME: CPT

## 2025-09-08 PROCEDURE — 85730 THROMBOPLASTIN TIME PARTIAL: CPT

## 2025-09-08 PROCEDURE — 80053 COMPREHEN METABOLIC PANEL: CPT

## 2025-09-08 PROCEDURE — 70480 CT ORBIT/EAR/FOSSA W/O DYE: CPT | Mod: 26

## 2025-09-08 RX ORDER — FLUORESCEIN SODIUM 0.6 MG
1 STRIP OPHTHALMIC (EYE) ONCE
Refills: 0 | Status: COMPLETED | OUTPATIENT
Start: 2025-09-08 | End: 2025-09-08

## 2025-09-08 RX ORDER — TETRACAINE HYDROCHLORIDE 5 MG/ML
1 SOLUTION OPHTHALMIC ONCE
Refills: 0 | Status: COMPLETED | OUTPATIENT
Start: 2025-09-08 | End: 2025-09-08

## 2025-09-08 RX ADMIN — TETRACAINE HYDROCHLORIDE 1 DROP(S): 5 SOLUTION OPHTHALMIC at 14:21

## 2025-09-08 RX ADMIN — Medication 1 APPLICATION(S): at 14:21
